# Patient Record
Sex: MALE | Race: BLACK OR AFRICAN AMERICAN | NOT HISPANIC OR LATINO | Employment: FULL TIME | URBAN - METROPOLITAN AREA
[De-identification: names, ages, dates, MRNs, and addresses within clinical notes are randomized per-mention and may not be internally consistent; named-entity substitution may affect disease eponyms.]

---

## 2017-10-09 ENCOUNTER — OFFICE VISIT (OUTPATIENT)
Dept: URGENT CARE | Facility: CLINIC | Age: 57
End: 2017-10-09
Payer: COMMERCIAL

## 2017-10-09 ENCOUNTER — APPOINTMENT (OUTPATIENT)
Dept: RADIOLOGY | Facility: CLINIC | Age: 57
End: 2017-10-09
Payer: COMMERCIAL

## 2017-10-09 DIAGNOSIS — R06.02 SHORTNESS OF BREATH: ICD-10-CM

## 2017-10-09 PROCEDURE — 71020 HB CHEST X-RAY 2VW FRONTAL&LATL: CPT

## 2017-10-09 PROCEDURE — 99214 OFFICE O/P EST MOD 30 MIN: CPT

## 2017-10-28 NOTE — PROGRESS NOTES
Assessment    1  SOB (shortness of breath) on exertion (786 05) (R06 02)   2  Upper respiratory infection, acute (465 9) (J06 9)    Plan   SOB (shortness of breath) on exertion    · Albuterol Sulfate (2 5 MG/3ML) 0 083% Inhalation Nebulization Solution   · Albuterol Sulfate (2 5 MG/3ML) 0 083% Inhalation Nebulization Solution   · Ipratropium Bromide 0 02 % Inhalation Solution   · Ipratropium Bromide 0 02 % Inhalation Solution  Upper respiratory infection, acute    · Azithromycin 250 MG Oral Tablet; TAKE 2 TABLETS ON DAY 1 THEN TAKE 1TABLET A DAY FOR 4 DAYS   · Benzonatate 200 MG Oral Capsule; TAKE 1 CAPSULE 3 times daily PRN cough   · PredniSONE 10 MG Oral Tablet; 6,5,4,3,2,2,1,1   · Ventolin  (90 Base) MCG/ACT Inhalation Aerosol Solution; INHALE 1 TO 2PUFFS EVERY 4 TO 6 HOURS AS NEEDED  * XR CHEST PA & LATERAL; Status:Resulted - Requires Verification;   Done: 28YDZ6060 12:00AM [id number];LVKQEXC; Stat;   For:SOB (shortness of breath) on exertion; Ordered By:Raissa Conteh;    Discussion/Summary  Discussion Summary:   Follow up with the pcpmeds as directed zyrtec daily meds as discussed to ER if symptoms worsensmoking  treatment given to pt by Socorro Severin  Pt tolerated well  Felt better on dischargeO2 sat was 96-97% no distress noted  treatment and prior to leaving pt stayed at O2 sats of 97% on RA  Medication Side Effects Reviewed: Possible side effects of new medications were reviewed with the patient/guardian today  Understands and agrees with treatment plan: The treatment plan was reviewed with the patient/guardian  The patient/guardian understands and agrees with the treatment plan   Counseling Documentation With Imm: The patient was counseled regarding instructions for management,-- patient and family education,-- importance of compliance with treatment  Follow Up Instructions: Follow Up with your Primary Care Provider in 1-2 days     If your symptoms worsen, go to the nearest [facility] Emergency Department  Chief Complaint    1  Cough  Chief Complaint Free Text Note Form: pt reports body aches mainly in shoulder blades and rib cage for 1+ week; productive cough with light yellow sputum; pt reports taking nyquil and now having issues controlling urine; 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 and requesting meds without alcohol content; pain 6/10      History of Present Illness  HPI: 62 yo male who is here today with coughing, body aches for over one week  He believes that at work he was exposed to mold at work  He has been sick since and having coughing spells  He has taken some OTC meds for chest congestion and nyquil (non alcoholic) and has some relief to help him sleep at night but he would notice that he had increased since taking nyquil  He stopped taking nyquil a few days ago,   Hospital Based Practices Required Assessment:  Pain Assessment  the patient states they have pain  Abuse And Domestic Violence Screen   Yes, the patient is safe at home  -- The patient states no one is hurting them  Depression And Suicide Screen  No, the patient has not had thoughts of hurting themself  No, the patient has not felt depressed in the past 7 days  Prefered Language is  english  Primary Language is  english  Review of Systems  Focused-Male:  Cardiovascular: no complaints of slow or fast heart rate, no chest pain, no palpitations, no leg claudication or lower extremity edema  Respiratory: as noted in HPI  ROS Reviewed:   ROS reviewed  Active Problems  1  Abdominal mass (789 30) (R19 00)   2  Abnormal glucose (790 29) (R73 09)   3  Acute sinusitis (461 9) (J01 90)   4  Current every day smoker (305 1) (F17 200)   5  Erectile dysfunction (607 84) (N52 9)   6  Hyperlipidemia (272 4) (E78 5)    Past Medical History  1  History of Denial Of Any Significant Medical History   2  History of bronchitis (V12 69) (Z87 09)   3  History of bronchitis (V12 69) (Z87 09)   4   History of pneumonia (V12 61) (Z87 01)  Active Problems And Past Medical History Reviewed: The active problems and past medical history were reviewed and updated today  Family History  Father    1  Family history of diabetes mellitus (V18 0) (Z83 3)  Paternal Grandfather    2  Family history of diabetes mellitus (V18 0) (Z83 3)  Uncle    3  Family history of Dialysis   4  Family history of diabetes mellitus (V18 0) (Z83 3)  Family History Reviewed: The family history was reviewed and updated today  Social History   · Current every day smoker (305 1) (F17 200)   · Current Every Day Smoker (305 1)   · History of Drug usage (305 90) (F19 90)  Social History Reviewed: The social history was reviewed and updated today  The social history was reviewed and is unchanged  Surgical History    1  History of Biopsy Soft Tissue Of The Back   2  History of Hand Surgery   3  History of Hernia Repair   4  History of Oral Surgery Tooth Extraction  Surgical History Reviewed: The surgical history was reviewed and updated today  Current Meds   1  Albuterol Sulfate (2 5 MG/3ML) 0 083% Inhalation Nebulization Solution; USE 1 UNIT DOSE IN NEBULIZER EVERY 6 HOURS AS NEEDED; Therapy: [id number] to [phone])  Requested for: 43CZL5500; Last Rx:[id number] Ordered   2  Flax Seed Oil 1000 MG Oral Capsule; Take as directed Recorded  Medication List Reviewed: The medication list was reviewed and updated today  Allergies    1  Penicillins    Vitals  Signs   Recorded: 30DND7368 11:51AM   Temperature: 98 4 F  Heart Rate: 105  Respiration: 18  Systolic: 408  Diastolic: 71  Height: 6 ft   Weight: 260 lb 8 oz  BMI Calculated: 35 33  BSA Calculated: 2 38  O2 Saturation: 92  Pain Scale: 6    Physical Exam   Constitutional  General appearance: No acute distress, well appearing and well nourished     Ears, Nose, Mouth, and Throat  External inspection of ears and nose: Normal    Otoscopic examination: Tympanic membrance translucent with normal light reflex  Canals patent without erythema  Nasal mucosa, septum, and turbinates: Normal without edema or erythema  Oropharynx: Normal with no erythema, edema, exudate or lesions  Pulmonary  Respiratory effort: Abnormal  -- increased coughing, congestion, sputurm production seen, yellow green mucus  Auscultation of lungs: Abnormal  -- decreased breath sounds b/l, increased congestion, increased rales noted in both lungs  Cardiovascular  Palpation of heart: Normal PMI, no thrills  Auscultation of heart: Abnormal  -- tachy  Abdomen  Abdomen: Non-tender, no masses  Lymphatic  Palpation of lymph nodes in neck: No lymphadenopathy  Musculoskeletal  Gait and station: Normal    Skin  Skin and subcutaneous tissue: Normal without rashes or lesions  Psychiatric  Orientation to person, place and time: Normal    Mood and affect: Normal    Additional Exam:  increased cough and mucus production noted, pt has been feeling like this for the past few days  Results/Data  Diagnostic Studies Reviewed: I personally reviewed the films/images/results in the office today  My interpretation follows  X-ray Review normal       Procedure   Treatment #1 included supplemental oxygen 2 L/min via nasal cannula  After the first nebulizer treatment, examination at revealed an oxygen saturation of 96%  After treatment #1, the patient noted symptomatic improvement  Air exchange was improved  No wheezes were appreciated  Rhonchi were heard diffusely  Rales were heard at the left lower lung field and at the right lower lung field  Signatures   Electronically signed by :  HUA Brewer; Oct  9 2017  4:02PM EST                       (Author)    Electronically signed by : Willy Panchal DO; Oct 10 2017  9:40AM EST                       (Co-author)

## 2018-05-23 RX ORDER — ALBUTEROL SULFATE 2.5 MG/3ML
1 SOLUTION RESPIRATORY (INHALATION) EVERY 6 HOURS PRN
COMMUNITY
Start: 2015-03-24 | End: 2018-05-24

## 2018-05-23 RX ORDER — ALBUTEROL SULFATE 90 UG/1
1-2 AEROSOL, METERED RESPIRATORY (INHALATION)
COMMUNITY
Start: 2017-10-09

## 2018-05-23 RX ORDER — DIMENHYDRINATE 50 MG
TABLET ORAL
COMMUNITY
End: 2018-05-24

## 2018-05-24 ENCOUNTER — OFFICE VISIT (OUTPATIENT)
Dept: FAMILY MEDICINE CLINIC | Facility: CLINIC | Age: 58
End: 2018-05-24
Payer: COMMERCIAL

## 2018-05-24 ENCOUNTER — APPOINTMENT (OUTPATIENT)
Dept: LAB | Facility: CLINIC | Age: 58
End: 2018-05-24
Payer: COMMERCIAL

## 2018-05-24 VITALS
DIASTOLIC BLOOD PRESSURE: 72 MMHG | HEIGHT: 72 IN | SYSTOLIC BLOOD PRESSURE: 120 MMHG | BODY MASS INDEX: 34.72 KG/M2 | RESPIRATION RATE: 16 BRPM | HEART RATE: 74 BPM | WEIGHT: 256.3 LBS | TEMPERATURE: 97.8 F

## 2018-05-24 DIAGNOSIS — N40.0 BENIGN PROSTATIC HYPERPLASIA WITHOUT LOWER URINARY TRACT SYMPTOMS: ICD-10-CM

## 2018-05-24 DIAGNOSIS — R73.09 ABNORMAL GLUCOSE: ICD-10-CM

## 2018-05-24 DIAGNOSIS — E78.5 HYPERLIPIDEMIA, UNSPECIFIED HYPERLIPIDEMIA TYPE: ICD-10-CM

## 2018-05-24 DIAGNOSIS — Z13.220 SCREENING FOR LIPID DISORDERS: ICD-10-CM

## 2018-05-24 DIAGNOSIS — Z11.59 ENCOUNTER FOR HEPATITIS C SCREENING TEST FOR LOW RISK PATIENT: ICD-10-CM

## 2018-05-24 DIAGNOSIS — Z12.5 SCREENING FOR PROSTATE CANCER: ICD-10-CM

## 2018-05-24 DIAGNOSIS — R09.89 GLOBUS SENSATION: ICD-10-CM

## 2018-05-24 DIAGNOSIS — Z12.11 SCREENING FOR COLON CANCER: ICD-10-CM

## 2018-05-24 DIAGNOSIS — Z00.00 WELL ADULT EXAM: Primary | ICD-10-CM

## 2018-05-24 LAB
ALBUMIN SERPL BCP-MCNC: 4.2 G/DL (ref 3.5–5)
ALP SERPL-CCNC: 68 U/L (ref 46–116)
ALT SERPL W P-5'-P-CCNC: 33 U/L (ref 12–78)
ANION GAP SERPL CALCULATED.3IONS-SCNC: 6 MMOL/L (ref 4–13)
AST SERPL W P-5'-P-CCNC: 20 U/L (ref 5–45)
BASOPHILS # BLD AUTO: 0.03 THOUSANDS/ΜL (ref 0–0.1)
BASOPHILS NFR BLD AUTO: 0 % (ref 0–1)
BILIRUB SERPL-MCNC: 0.46 MG/DL (ref 0.2–1)
BUN SERPL-MCNC: 16 MG/DL (ref 5–25)
CALCIUM SERPL-MCNC: 9.9 MG/DL (ref 8.3–10.1)
CHLORIDE SERPL-SCNC: 107 MMOL/L (ref 100–108)
CHOLEST SERPL-MCNC: 192 MG/DL (ref 50–200)
CO2 SERPL-SCNC: 27 MMOL/L (ref 21–32)
CREAT SERPL-MCNC: 1.1 MG/DL (ref 0.6–1.3)
CREAT UR-MCNC: 189 MG/DL
EOSINOPHIL # BLD AUTO: 0.32 THOUSAND/ΜL (ref 0–0.61)
EOSINOPHIL NFR BLD AUTO: 4 % (ref 0–6)
ERYTHROCYTE [DISTWIDTH] IN BLOOD BY AUTOMATED COUNT: 13.7 % (ref 11.6–15.1)
EST. AVERAGE GLUCOSE BLD GHB EST-MCNC: 177 MG/DL
GFR SERPL CREATININE-BSD FRML MDRD: 84 ML/MIN/1.73SQ M
GLUCOSE P FAST SERPL-MCNC: 123 MG/DL (ref 65–99)
HBA1C MFR BLD: 7.8 % (ref 4.2–6.3)
HCT VFR BLD AUTO: 42.7 % (ref 36.5–49.3)
HDLC SERPL-MCNC: 37 MG/DL (ref 40–60)
HGB BLD-MCNC: 14.1 G/DL (ref 12–17)
LDLC SERPL CALC-MCNC: 102 MG/DL (ref 0–100)
LYMPHOCYTES # BLD AUTO: 2.91 THOUSANDS/ΜL (ref 0.6–4.47)
LYMPHOCYTES NFR BLD AUTO: 34 % (ref 14–44)
MCH RBC QN AUTO: 30.4 PG (ref 26.8–34.3)
MCHC RBC AUTO-ENTMCNC: 33 G/DL (ref 31.4–37.4)
MCV RBC AUTO: 92 FL (ref 82–98)
MICROALBUMIN UR-MCNC: 18.8 MG/L (ref 0–20)
MICROALBUMIN/CREAT 24H UR: 10 MG/G CREATININE (ref 0–30)
MONOCYTES # BLD AUTO: 0.45 THOUSAND/ΜL (ref 0.17–1.22)
MONOCYTES NFR BLD AUTO: 5 % (ref 4–12)
NEUTROPHILS # BLD AUTO: 4.86 THOUSANDS/ΜL (ref 1.85–7.62)
NEUTS SEG NFR BLD AUTO: 57 % (ref 43–75)
NONHDLC SERPL-MCNC: 155 MG/DL
NRBC BLD AUTO-RTO: 0 /100 WBCS
PLATELET # BLD AUTO: 390 THOUSANDS/UL (ref 149–390)
PMV BLD AUTO: 10.5 FL (ref 8.9–12.7)
POTASSIUM SERPL-SCNC: 4 MMOL/L (ref 3.5–5.3)
PROT SERPL-MCNC: 8.2 G/DL (ref 6.4–8.2)
PSA SERPL-MCNC: 2.4 NG/ML (ref 0–4)
RBC # BLD AUTO: 4.64 MILLION/UL (ref 3.88–5.62)
SODIUM SERPL-SCNC: 140 MMOL/L (ref 136–145)
TRIGL SERPL-MCNC: 264 MG/DL
WBC # BLD AUTO: 8.6 THOUSAND/UL (ref 4.31–10.16)

## 2018-05-24 PROCEDURE — 83036 HEMOGLOBIN GLYCOSYLATED A1C: CPT

## 2018-05-24 PROCEDURE — 82043 UR ALBUMIN QUANTITATIVE: CPT | Performed by: FAMILY MEDICINE

## 2018-05-24 PROCEDURE — 36415 COLL VENOUS BLD VENIPUNCTURE: CPT

## 2018-05-24 PROCEDURE — 99396 PREV VISIT EST AGE 40-64: CPT | Performed by: FAMILY MEDICINE

## 2018-05-24 PROCEDURE — 80053 COMPREHEN METABOLIC PANEL: CPT

## 2018-05-24 PROCEDURE — 80061 LIPID PANEL: CPT

## 2018-05-24 PROCEDURE — 86803 HEPATITIS C AB TEST: CPT

## 2018-05-24 PROCEDURE — 85025 COMPLETE CBC W/AUTO DIFF WBC: CPT

## 2018-05-24 PROCEDURE — 82570 ASSAY OF URINE CREATININE: CPT | Performed by: FAMILY MEDICINE

## 2018-05-24 PROCEDURE — 3061F NEG MICROALBUMINURIA REV: CPT | Performed by: FAMILY MEDICINE

## 2018-05-24 PROCEDURE — G0103 PSA SCREENING: HCPCS

## 2018-05-24 NOTE — PATIENT INSTRUCTIONS
Hyperlipidemia  Due for FLP, will check today, pt reports he is fasting    Benign prostatic hyperplasia  For now defer medication, will monitor and check PSA as well    Abnormal glucose  In the past the patient has had an elevated A1c, this was not confirmed with any follow-up testing that I can tell, we will check him for diabetes now with a CBC, fasting blood sugar, A1c, and I will add on a urine microalbumin to check his kidneys  If he does in fact have an A1c that is elevated we will most likely want to start some of the medicines we discussed today including a baby aspirin, lisinopril for renal protection, and metformin to lower his blood sugars  If this is the case we will bring him back to the office to discuss diabetic measures  I would recommend he also have annual foot exam, dental exam, and I am looking for diabetic retinopathy if this is the case  Globus sensation  The patient I suspect has postnasal drip causing some hypertrophy and irritation at the back of his throat, prior to sending to ear nose and throat I would recommended 2 week trial of an over-the-counter antihistamine tablet like Claritin, Zyrtec, or Allegra, in addition to a nasal spray such as Flonase  He may use all of these as directed on their over-the-counter box instructions      HM  The patient reports he declined vaccinations at this time, he would be willing to revisit the shingles vaccine and the pneumonia vaccine in the future, we will await some of the results of his testing  I recommend that the patient have a colonoscopy, this is recommended for all patients over the age of 48, there are other screening tests available however if they are positive that he must have a colonoscopy anyway  Referral given for colonoscopy  Encouraged the patient to continue to follow a healthy diet and exercise regularly, the recommendation is at least 150 min weekly of moderate intensity activity    Patient agreeable today for lab screening test for hepatitis C, PSA, lipids

## 2018-05-24 NOTE — ASSESSMENT & PLAN NOTE
The patient I suspect has postnasal drip causing some hypertrophy and irritation at the back of his throat, prior to sending to ear nose and throat I would recommended 2 week trial of an over-the-counter antihistamine tablet like Claritin, Zyrtec, or Allegra, in addition to a nasal spray such as Flonase    He may use all of these as directed on their over-the-counter box instructions

## 2018-05-24 NOTE — ASSESSMENT & PLAN NOTE
In the past the patient has had an elevated A1c, this was not confirmed with any follow-up testing that I can tell, we will check him for diabetes now with a CBC, fasting blood sugar, A1c, and I will add on a urine microalbumin to check his kidneys  If he does in fact have an A1c that is elevated we will most likely want to start some of the medicines we discussed today including a baby aspirin, lisinopril for renal protection, and metformin to lower his blood sugars  If this is the case we will bring him back to the office to discuss diabetic measures  I would recommend he also have annual foot exam, dental exam, and I am looking for diabetic retinopathy if this is the case

## 2018-05-24 NOTE — PROGRESS NOTES
Family Medicine Annual Office Visit  Kacie Bell 60 y o  male   MRN: [id number] : 1960  ENCOUNTER: 2018 9:41 AM    Assessment and Plan   Hyperlipidemia  Due for FLP, will check today, pt reports he is fasting    Benign prostatic hyperplasia  For now defer medication, will monitor and check PSA as well    Abnormal glucose  In the past the patient has had an elevated A1c, this was not confirmed with any follow-up testing that I can tell, we will check him for diabetes now with a CBC, fasting blood sugar, A1c, and I will add on a urine microalbumin to check his kidneys  If he does in fact have an A1c that is elevated we will most likely want to start some of the medicines we discussed today including a baby aspirin, lisinopril for renal protection, and metformin to lower his blood sugars  If this is the case we will bring him back to the office to discuss diabetic measures  I would recommend he also have annual foot exam, dental exam, and I am looking for diabetic retinopathy if this is the case  Globus sensation  The patient I suspect has postnasal drip causing some hypertrophy and irritation at the back of his throat, prior to sending to ear nose and throat I would recommended 2 week trial of an over-the-counter antihistamine tablet like Claritin, Zyrtec, or Allegra, in addition to a nasal spray such as Flonase  He may use all of these as directed on their over-the-counter box instructions    HM  The patient reports he declined vaccinations at this time, he would be willing to revisit the shingles vaccine and the pneumonia vaccine in the future, we will await some of the results of his testing  I recommend that the patient have a colonoscopy, this is recommended for all patients over the age of 48, there are other screening tests available however if they are positive that he must have a colonoscopy anyway   Referral given for colonoscopy  Encouraged the patient to continue to follow a healthy diet and exercise regularly, the recommendation is at least 150 min weekly of moderate intensity activity  Patient agreeable today for lab screening test for hepatitis C, PSA, lipids  Chief Complaint     Chief Complaint   Patient presents with    Annual Exam    Sore Throat     started a few weeks ago, feels like something is stuck        History of Present Illness   Aiden Busby is a 60y o -year-old male who presents today for Well visit  Smokes <1ppd x 45 years  Does not drink ETOH  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---sober for 20 years, used to do "everything" but did not use needles  Patient reports that he is in a long term relationship, he is happy in his relationship, children are 20 and 21, one child still lives at home  Works for [employer] as a   Does exercise--walks for fitness  Eating a healthy diet, he tries to limit sugar containing foods or added sugars    Has had a globus sensation in his throat for about 3 weeks, trying to drink down to make it resolve, he is clearing his throat regularly  Still able to eat and drink but not resolving  + allergy symptoms (PND) and has a long history of sinus infections  Not currently taking any medications  Does not know of any medical problems he has  Noted there is an elevated A1c in his chart, he was unaware  Not taking any medications    Does not believe in vaccines because they make him sick  Review of Systems   Review of Systems   Constitutional: Negative for chills, fatigue, fever and unexpected weight change  HENT: Positive for postnasal drip, rhinorrhea and trouble swallowing (Globus sensation in throat x3 weeks)  Negative for hearing loss and sore throat  Eyes: Negative for discharge and visual disturbance  Respiratory: Negative for shortness of breath  Cardiovascular: Negative for chest pain  Gastrointestinal: Negative for constipation, diarrhea, nausea and vomiting  Genitourinary: Negative for dysuria          No incontinence   Musculoskeletal: Positive for arthralgias (aches and pains, over did yesterday with mowing lanw) and neck stiffness (ongoing for some time, patient attributes to how he sleeps)  Negative for myalgias  Skin: Negative for rash  Neurological: Negative for headaches  Hematological: Negative for adenopathy  Does not bruise/bleed easily  Psychiatric/Behavioral:        No anxiety or depression   All other systems reviewed and are negative  Active Problem List     Patient Active Problem List   Diagnosis    Abnormal glucose    Erectile dysfunction    Hyperlipidemia    Benign prostatic hyperplasia    Globus sensation       Past Medical History, Past Surgical History, Family History, and Social History were reviewed and updated today as appropriate  Objective   /72   Pulse 74   Temp 97 8 °F (36 6 °C)   Resp 16   Ht 6' (1 829 m)   Wt 116 kg (256 lb 4 8 oz)   BMI 34 76 kg/m²     Physical Exam   Constitutional: He is oriented to person, place, and time  Vital signs are normal  He appears well-developed and well-nourished  Non-toxic appearance  No distress  Appears Stated Age, obese, tall   HENT:   Head: Normocephalic and atraumatic  Right Ear: External ear normal    Left Ear: External ear normal    Nose: Nose normal    Mouth/Throat: Oropharynx is clear and moist  No oropharyngeal exudate  No significant oral abnormalities other than poor dentition   Eyes: Conjunctivae and EOM are normal  Pupils are equal, round, and reactive to light  Right eye exhibits no discharge  Left eye exhibits no discharge  Left conjunctiva is not injected  No scleral icterus  Right eye exhibits no nystagmus  Left eye exhibits no nystagmus  Accomodation intact   Neck: Normal range of motion  Neck supple  Carotid bruit is not present  No thyromegaly present  Cardiovascular: Normal rate, regular rhythm, S1 normal and S2 normal   Exam reveals no gallop and no friction rub      No murmur heard   Pulmonary/Chest: Effort normal and breath sounds normal  No respiratory distress  He has no wheezes  He has no rhonchi  He has no rales  No bruit   Abdominal: Soft  Bowel sounds are normal  He exhibits no distension  There is no tenderness  There is no rebound and no guarding  Genitourinary: Rectum normal  No penile tenderness  Genitourinary Comments: Mild uniform protstatic hypertrophy without nodularity   Musculoskeletal: He exhibits no edema  No clubbing or cyanosis   Lymphadenopathy:     He has no cervical adenopathy  Neurological: He is alert and oriented to person, place, and time  No cranial nerve deficit  He exhibits normal muscle tone  No focal neurologic deficits noted on exam, no change from baseline status   Skin: Skin is warm and dry  No rash noted  He is not diaphoretic  Psychiatric: He has a normal mood and affect  His behavior is normal  Thought content normal    stable   Vitals reviewed      Diabetic Foot Exam    Pertinent Laboratory/Diagnostic Studies:  Lab Results   Component Value Date    GLUCOSE 127 04/21/2015    BUN 14 04/21/2015    CREATININE 1 00 04/21/2015    CALCIUM 9 2 04/21/2015     04/21/2015    K 3 9 04/21/2015    CO2 24 04/21/2015     04/21/2015     Lab Results   Component Value Date    ALT 28 04/21/2015    AST 17 04/21/2015    ALKPHOS 68 04/21/2015    BILITOT 0 34 04/21/2015       Lab Results   Component Value Date    WBC 6 10 04/21/2015    HGB 13 7 04/21/2015    HCT 41 3 04/21/2015    MCV 92 04/21/2015     04/21/2015       No results found for: TSH    Lab Results   Component Value Date    CHOL 217 04/21/2015     Lab Results   Component Value Date    TRIG 390 04/21/2015     Lab Results   Component Value Date    HDL 42 04/21/2015     Lab Results   Component Value Date    LDLCALC 97 04/21/2015     Lab Results   Component Value Date    HGBA1C 7 2 (H) 04/21/2015       Results for orders placed or performed in visit on 04/21/15   TESTOSTERONE, FREE, TOTAL (HISTORICAL)   Result Value Ref Range    TESTOSTERONE TOTAL 135 (L) 348 - 1,197 ng/dL    TESTOSTERONE FREE 5 6 (L) 7 2 - 24 0 pg/mL    TESTOSTERONE COMMENT Comment    HEMOGLOBIN A1C (HISTORICAL)   Result Value Ref Range    Hemoglobin A1C 7 2 (H) 4 0 - 5 6 %     mg/dL   PSA,TOTAL SCREEN (HISTORICAL)   Result Value Ref Range    PSA 1 9 0 0 - 4 0 ng/mL   TSH, 3RD GENERATION WITH T4 REFLEX (HISTORICAL)   Result Value Ref Range    TSH 3RD GENERATON 1 253 0 550 - 4 780 uIU/ML   LIPID PANEL WITH DIRECT LDL REFLEX (HISTORICAL)   Result Value Ref Range    Triglycerides 390 mg/dL    Cholesterol 217 mg/dL    HDL 42 mg/dL    LDL Calculated 97 0 - 100 mg/dL   COMPREHENSIVE METABOLIC PANEL (HISTORICAL)   Result Value Ref Range    Sodium 141 136 - 145 mmol/L    Potassium 3 9 3 5 - 5 3 mmol/L    Chloride 106 100 - 108 mmol/L    CO2 24 23 - 33 mmol/L    Anion Gap 11 4 - 13 mmol/L    Glucose 127 65 - 140 mg/dL    BUN 14 5 - 25 mg/dL    Calcium 9 2 8 3 - 10 1 mg/dL    Creatinine 1 00 0 60 - 1 30 mg/dL    Total Bilirubin 0 34 0 20 - 1 00 mg/dL    Total Protein 7 6 6 4 - 8 2 g/dL    Alkaline Phosphatase 68 46 - 116 U/L    ALT 28 16 - 63 U/L    AST 17 0 - 45 U/L    Albumin 3 7 3 5 - 5 0 g/dL    AAGFR >60 ml/min/1 73sq m    NAAGFR >60 ml/min/1 73sq m   CBC AND DIFFERENTIAL (HISTORICAL)   Result Value Ref Range    WBC 6 10 4 31 - 10 16 Thousand/uL    RBC 4 50 3 88 - 5 62 Million/uL    Hemoglobin 13 7 12 0 - 17 0 g/dL    Hematocrit 41 3 36 5 - 49 3 %    MCV 92 82 - 98 fL    MCH 30 4 26 8 - 34 3 pg    MCHC 33 2 31 4 - 37 4 g/dL    Platelets 546 782 - 491 Thousand/uL    Neutrophils Relative 55 43 - 75 %    Neutrophils Absolute 3 36 1 85 - 7 62 Thousand/uL    RDW 13 7 11 6 - 15 1 %    MPV 10 6 8 9 - 12 7 fL    nRBC 0 /100 WBC    Lymphocytes Relative 33 14 - 44 %    Monocytes Relative 7 4 - 12 %    Eosinophils Relative 5 0 - 6 %    Lymphocytes Absolute 2 01 0 60 - 4 47 Thousand/uL    Monocytes Absolute 0 43 0 17 - 1 22 Thousand/uL Eosinophils Absolute 0 31 0 00 - 0 61 Thousand/uL    Basophils Absolute 0 02 0 00 - 0 10 Thousand/uL       Orders Placed This Encounter   Procedures    CBC and differential    Comprehensive metabolic panel    HEMOGLOBIN A1C W/ EAG ESTIMATION    Lipid panel    PSA, Total Screen    Hepatitis C antibody    Microalbumin / creatinine urine ratio    Ambulatory referral to Gastroenterology         Current Medications     Current Outpatient Prescriptions   Medication Sig Dispense Refill    albuterol (VENTOLIN HFA) 90 mcg/act inhaler Inhale 1-2 puffs       No current facility-administered medications for this visit  ALLERGIES:  Allergies   Allergen Reactions    Penicillins        Health Maintenance     Health Maintenance   Topic Date Due    HIV SCREENING  1960    Hepatitis C Screening  1960    COLONOSCOPY  1960    PNEUMOCOCCAL POLYSACCHARIDE VACCINE AGE 2-64 HIGH RISK  01/20/1961    Depression Screening PHQ-9  01/20/1971    DTaP,Tdap,and Td Vaccines (1 - Tdap) 01/20/1980    INFLUENZA VACCINE  09/01/2018       There is no immunization history on file for this patient  Andrés Rodriguez MD   [id number]  5/24/2018  9:41 AM    Parts of this note were dictated using M*Modal dictation software and may have sounds-like errors due to variation in pronunciation

## 2018-05-25 LAB — HCV AB SER QL: NORMAL

## 2018-05-30 ENCOUNTER — OFFICE VISIT (OUTPATIENT)
Dept: FAMILY MEDICINE CLINIC | Facility: CLINIC | Age: 58
End: 2018-05-30
Payer: COMMERCIAL

## 2018-05-30 VITALS
TEMPERATURE: 97.7 F | SYSTOLIC BLOOD PRESSURE: 122 MMHG | HEIGHT: 72 IN | BODY MASS INDEX: 34.47 KG/M2 | HEART RATE: 80 BPM | RESPIRATION RATE: 20 BRPM | DIASTOLIC BLOOD PRESSURE: 74 MMHG | WEIGHT: 254.5 LBS

## 2018-05-30 DIAGNOSIS — E78.5 HYPERLIPIDEMIA, UNSPECIFIED HYPERLIPIDEMIA TYPE: ICD-10-CM

## 2018-05-30 DIAGNOSIS — E11.9 TYPE 2 DIABETES MELLITUS WITHOUT COMPLICATION, WITHOUT LONG-TERM CURRENT USE OF INSULIN (HCC): Primary | ICD-10-CM

## 2018-05-30 PROCEDURE — 99214 OFFICE O/P EST MOD 30 MIN: CPT | Performed by: FAMILY MEDICINE

## 2018-05-30 PROCEDURE — 3008F BODY MASS INDEX DOCD: CPT | Performed by: FAMILY MEDICINE

## 2018-05-30 PROCEDURE — 4010F ACE/ARB THERAPY RXD/TAKEN: CPT | Performed by: FAMILY MEDICINE

## 2018-05-30 RX ORDER — LISINOPRIL 2.5 MG/1
2.5 TABLET ORAL DAILY
Qty: 30 TABLET | Refills: 2 | Status: SHIPPED | OUTPATIENT
Start: 2018-05-30

## 2018-05-30 RX ORDER — ATORVASTATIN CALCIUM 20 MG/1
20 TABLET, FILM COATED ORAL DAILY
Qty: 30 TABLET | Refills: 2 | Status: SHIPPED | OUTPATIENT
Start: 2018-05-30

## 2018-05-30 NOTE — ASSESSMENT & PLAN NOTE
The patient I extensively discussed that nature of diabetes today and what we can do to control it  I am very impressed with his dedication to changing his diet, and I look forward to seeing his great success with this     For now we will start with needing some diabetic core measures, I have recommended to the patient a baby aspirin daily, he believes he may have an allergy to aspirin in his family and he will check with family members before starting this medication    I recommended to the patient that he get vaccinated against pneumonia and shingles, he will take this under advisement but we will not  Vaccinated today, this will be addressed again at the next visit    The patient is agreeable to starting a statin medicine for cholesterol and vascular protection  Will start Lipitor 20 mg daily  We did discuss risks, benefits and alternatives  The patient is agreeable to starting metformin, we will do a titration program where he takes 1 tablet daily for 1 week with a meal, this should be taken with his largest meal of the day  The following week if he is tolerating this medicine he will take 1 tablet daily with breakfast and dinner  The next week he will take 1 tablet with breakfast and 2 tablets with dinner  The following week he will start routine maintenance dosing at 2 tablets with breakfast and 2 tablets with dinner  If he is tolerating this well we can change to the 1000 mg tablets so that he just asked to take 1 tablet with each meal     The patient's blood pressure control is excellent however given his diabetes I will start him on a daily 2 5 mg lisinopril for protection of his kidneys  He is agreeable to this   We did a diabetic foot exam today that was normal   The neck time the patient sees his eye doctor he should request a diabetic eye exam and have this sent to our office as well  Will see the patient back in 3-4 months I have given him a slip to have an A1c done before his next visit

## 2018-05-30 NOTE — PATIENT INSTRUCTIONS
Hyperlipidemia  Given lipids and DM2, will start Statin    Type 2 diabetes mellitus without complication, without long-term current use of insulin ([facility] [address] )   The patient I extensively discussed that nature of diabetes today and what we can do to control it  I am very impressed with his dedication to changing his diet, and I look forward to seeing his great success with this     For now we will start with needing some diabetic core measures, I have recommended to the patient a baby aspirin daily, he believes he may have an allergy to aspirin in his family and he will check with family members before starting this medication    I recommended to the patient that he get vaccinated against pneumonia and shingles, he will take this under advisement but we will not  Vaccinated today, this will be addressed again at the next visit    The patient is agreeable to starting a statin medicine for cholesterol and vascular protection  Will start Lipitor 20 mg daily  We did discuss risks, benefits and alternatives  The patient is agreeable to starting metformin, we will do a titration program where he takes 1 tablet daily for 1 week with a meal, this should be taken with his largest meal of the day  The following week if he is tolerating this medicine he will take 1 tablet daily with breakfast and dinner  The next week he will take 1 tablet with breakfast and 2 tablets with dinner  The following week he will start routine maintenance dosing at 2 tablets with breakfast and 2 tablets with dinner  If he is tolerating this well we can change to the 1000 mg tablets so that he just asked to take 1 tablet with each meal     The patient's blood pressure control is excellent however given his diabetes I will start him on a daily 2 5 mg lisinopril for protection of his kidneys    He is agreeable to this   We did a diabetic foot exam today that was normal   The neck time the patient sees his eye doctor he should request a diabetic eye exam and have this sent to our office as well  Will see the patient back in 3-4 months I have given him a slip to have an A1c done before his next visit

## 2018-05-30 NOTE — PROGRESS NOTES
Family Medicine Follow-Up Office Visit  Cachorro Fisher 60 y o  male   MRN: [id number] : 1960  ENCOUNTER: 2018 9:51 AM    Assessment and Plan   Hyperlipidemia  Given lipids and DM2, will start Statin    Type 2 diabetes mellitus without complication, without long-term current use of insulin ([facility] )   The patient I extensively discussed that nature of diabetes today and what we can do to control it  I am very impressed with his dedication to changing his diet, and I look forward to seeing his great success with this     For now we will start with needing some diabetic core measures, I have recommended to the patient a baby aspirin daily, he believes he may have an allergy to aspirin in his family and he will check with family members before starting this medication    I recommended to the patient that he get vaccinated against pneumonia and shingles, he will take this under advisement but we will not  Vaccinated today, this will be addressed again at the next visit    The patient is agreeable to starting a statin medicine for cholesterol and vascular protection  Will start Lipitor 20 mg daily  We did discuss risks, benefits and alternatives  The patient is agreeable to starting metformin, we will do a titration program where he takes 1 tablet daily for 1 week with a meal, this should be taken with his largest meal of the day  The following week if he is tolerating this medicine he will take 1 tablet daily with breakfast and dinner  The next week he will take 1 tablet with breakfast and 2 tablets with dinner  The following week he will start routine maintenance dosing at 2 tablets with breakfast and 2 tablets with dinner  If he is tolerating this well we can change to the 1000 mg tablets so that he just asked to take 1 tablet with each meal     The patient's blood pressure control is excellent however given his diabetes I will start him on a daily 2 5 mg lisinopril for protection of his kidneys    He is agreeable to this   We did a diabetic foot exam today that was normal   The neck time the patient sees his eye doctor he should request a diabetic eye exam and have this sent to our office as well  Will see the patient back in 3-4 months I have given him a slip to have an A1c done before his next visit  I have spent 30 minutes with Patient  today in which greater than 50% of this time was spent in counseling/coordination of care regarding Diagnostic results, Prognosis, Risks and benefits of tx options, Intructions for management, Patient and family education, Importance of tx compliance, Risk factor reductions and Impressions  Chief Complaint     Chief Complaint   Patient presents with    Follow-up     discuss labs/DM       History of Present Illness   Nilsa Fulton is a 60y o -year-old male who presents today for DM follow up  He has already changed his diet since we called him to tell him that he had DM2  He has started having less carbs, drinking water, eating healthy protein  He has many family members who have been affected by diabetes and he feels very committed to change his lifestyle  He feels as though he does not want to ever be on insulin and he reports to me that his understanding is that if he is able to lose weight and exercise and eat better he might not ever need that  I have firm for him that he is correct in this  Has constant refrain is that he would like to be around for some time yet and he will do any in all things recommended within reason to help monitor this  Today he has no other symptoms or complaints, he is here to gather information on his new diagnosis of diabetes, discussed the treatment plan, and discussed projected trajectory  Review of Systems   Review of Systems   Constitutional: Negative for chills, fatigue, fever and unexpected weight change  HENT: Positive for postnasal drip and rhinorrhea  Negative for hearing loss and sore throat      Eyes: Negative for discharge and visual disturbance  Respiratory: Positive for cough (picked up a cough/cold recently, feels this is starting to improve already)  Negative for shortness of breath  Cardiovascular: Negative for chest pain  Gastrointestinal: Negative for constipation, diarrhea, nausea and vomiting  Genitourinary: Negative for dysuria  No incontinence   Musculoskeletal: Positive for arthralgias (chronic stable aches and pains) and neck stiffness (stable, ongoing for some time, patient attributes to how he sleeps)  Negative for myalgias  Skin: Negative for rash  Neurological: Negative for headaches  Hematological: Negative for adenopathy  Does not bruise/bleed easily  Psychiatric/Behavioral:        No anxiety or depression   All other systems reviewed and are negative  Active Problem List     Patient Active Problem List   Diagnosis    Type 2 diabetes mellitus without complication, without long-term current use of insulin ([address] )    Erectile dysfunction    Hyperlipidemia    Benign prostatic hyperplasia    Globus sensation       Past Medical History, Past Surgical History, Family History, and Social History were reviewed and updated today as appropriate  Objective   /74   Pulse 80   Temp 97 7 °F (36 5 °C)   Resp 20   Ht 6' (1 829 m)   Wt 115 kg (254 lb 8 oz)   BMI 34 52 kg/m²     Physical Exam   Constitutional: He is oriented to person, place, and time  He appears well-developed and well-nourished  No distress  HENT:   Head: Normocephalic and atraumatic  Mouth/Throat: Oropharynx is clear and moist    mmm   Eyes: Conjunctivae and EOM are normal  Pupils are equal, round, and reactive to light  No scleral icterus  Cardiovascular: Pulses are no weak pulses  Pulses:       Dorsalis pedis pulses are 2+ on the right side, and 2+ on the left side  Posterior tibial pulses are 2+ on the right side, and 2+ on the left side     Pulmonary/Chest: Effort normal  No respiratory distress  Abdominal: Soft  He exhibits no distension  Musculoskeletal: He exhibits no edema  Feet:   Right Foot:   Skin Integrity: Positive for callus and dry skin  Negative for ulcer, skin breakdown, erythema or warmth  Left Foot:   Skin Integrity: Positive for callus and dry skin  Negative for ulcer, skin breakdown, erythema or warmth  Lymphadenopathy:     He has no cervical adenopathy  Neurological: He is alert and oriented to person, place, and time  No cranial nerve deficit  Skin: Skin is warm and dry  He is not diaphoretic  Psychiatric: He has a normal mood and affect  His behavior is normal    Vitals reviewed  Patient's shoes and socks removed  Right Foot/Ankle   Right Foot Inspection  Skin Exam: skin normal, skin intact, dry skin, callus and callus no warmth, no erythema, no maceration, no abnormal color, no pre-ulcer and no ulcer                          Toe Exam: ROM and strength within normal limitsno swelling, no tenderness, erythema and  no right toe deformity  Sensory       Monofilament testing: intact  Vascular  Capillary refills: < 3 seconds  The right DP pulse is 2+  The right PT pulse is 2+  Left Foot/Ankle  Left Foot Inspection  Skin Exam: skin normal, skin intact, dry skin and callusno warmth, no erythema, no maceration, normal color, no pre-ulcer and no ulcer                         Toe Exam: ROM and strength within normal limitsno swelling, no tenderness, no erythema and no left toe deformity                   Sensory       Monofilament: intact  Vascular  Capillary refills: < 3 seconds  The left DP pulse is 2+  The left PT pulse is 2+  Assign Risk Category:  No deformity present; No loss of protective sensation;  No weak pulses       Risk: 0      Pertinent Laboratory/Diagnostic Studies:  Lab Results   Component Value Date    GLUCOSE 127 04/21/2015    BUN 16 05/24/2018    CREATININE 1 10 05/24/2018    CALCIUM 9 9 05/24/2018     05/24/2018    K 4 0 05/24/2018    CO2 27 05/24/2018     05/24/2018     Lab Results   Component Value Date    ALT 33 05/24/2018    AST 20 05/24/2018    ALKPHOS 68 05/24/2018    BILITOT 0 46 05/24/2018       Lab Results   Component Value Date    WBC 8 60 05/24/2018    HGB 14 1 05/24/2018    HCT 42 7 05/24/2018    MCV 92 05/24/2018     05/24/2018       No results found for: TSH    Lab Results   Component Value Date    CHOL 192 05/24/2018     Lab Results   Component Value Date    TRIG 264 (H) 05/24/2018     Lab Results   Component Value Date    HDL 37 (L) 05/24/2018     Lab Results   Component Value Date    LDLCALC 102 (H) 05/24/2018     Lab Results   Component Value Date    HGBA1C 7 8 (H) 05/24/2018       Results for orders placed or performed in visit on 05/24/18   CBC and differential   Result Value Ref Range    WBC 8 60 4 31 - 10 16 Thousand/uL    RBC 4 64 3 88 - 5 62 Million/uL    Hemoglobin 14 1 12 0 - 17 0 g/dL    Hematocrit 42 7 36 5 - 49 3 %    MCV 92 82 - 98 fL    MCH 30 4 26 8 - 34 3 pg    MCHC 33 0 31 4 - 37 4 g/dL    RDW 13 7 11 6 - 15 1 %    MPV 10 5 8 9 - 12 7 fL    Platelets 763 635 - 242 Thousands/uL    nRBC 0 /100 WBCs    Neutrophils Relative 57 43 - 75 %    Lymphocytes Relative 34 14 - 44 %    Monocytes Relative 5 4 - 12 %    Eosinophils Relative 4 0 - 6 %    Basophils Relative 0 0 - 1 %    Neutrophils Absolute 4 86 1 85 - 7 62 Thousands/µL    Lymphocytes Absolute 2 91 0 60 - 4 47 Thousands/µL    Monocytes Absolute 0 45 0 17 - 1 22 Thousand/µL    Eosinophils Absolute 0 32 0 00 - 0 61 Thousand/µL    Basophils Absolute 0 03 0 00 - 0 10 Thousands/µL   Comprehensive metabolic panel   Result Value Ref Range    Sodium 140 136 - 145 mmol/L    Potassium 4 0 3 5 - 5 3 mmol/L    Chloride 107 100 - 108 mmol/L    CO2 27 21 - 32 mmol/L    Anion Gap 6 4 - 13 mmol/L    BUN 16 5 - 25 mg/dL    Creatinine 1 10 0 60 - 1 30 mg/dL    Glucose, Fasting 123 (H) 65 - 99 mg/dL    Calcium 9 9 8 3 - 10 1 mg/dL    AST 20 5 - 45 U/L    ALT 33 12 - 78 U/L Alkaline Phosphatase 68 46 - 116 U/L    Total Protein 8 2 6 4 - 8 2 g/dL    Albumin 4 2 3 5 - 5 0 g/dL    Total Bilirubin 0 46 0 20 - 1 00 mg/dL    eGFR 84 ml/min/1 73sq m   HEMOGLOBIN A1C W/ EAG ESTIMATION   Result Value Ref Range    Hemoglobin A1C 7 8 (H) 4 2 - 6 3 %     mg/dl   Lipid panel   Result Value Ref Range    Cholesterol 192 50 - 200 mg/dL    Triglycerides 264 (H) <=150 mg/dL    HDL, Direct 37 (L) 40 - 60 mg/dL    LDL Calculated 102 (H) 0 - 100 mg/dL    Non-HDL-Chol (CHOL-HDL) 155 mg/dl   PSA, Total Screen   Result Value Ref Range    PSA 2 4 0 0 - 4 0 ng/mL   Hepatitis C antibody   Result Value Ref Range    Hepatitis C Ab Non-reactive Non-reactive       Orders Placed This Encounter   Procedures    HEMOGLOBIN A1C W/ EAG ESTIMATION         Current Medications     Current Outpatient Prescriptions   Medication Sig Dispense Refill    albuterol (VENTOLIN HFA) 90 mcg/act inhaler Inhale 1-2 puffs      atorvastatin (LIPITOR) 20 mg tablet Take 1 tablet (20 mg total) by mouth daily 30 tablet 2    lisinopril (ZESTRIL) 2 5 mg tablet Take 1 tablet (2 5 mg total) by mouth daily 30 tablet 2    metFORMIN (GLUCOPHAGE) 500 mg tablet 1 tablet daily qam x1 week, 1 tablet BIDAC x1 week, 1 tablet qam and 2 tablets q dinner x1 week, 2 tablets bidac 120 tablet 2     No current facility-administered medications for this visit          ALLERGIES:  Allergies   Allergen Reactions    Penicillins        Health Maintenance     Health Maintenance   Topic Date Due    HIV SCREENING  1960    COLONOSCOPY  1960    PNEUMOCOCCAL POLYSACCHARIDE VACCINE AGE 2-64 HIGH RISK  01/20/1961    Diabetic Foot Exam  01/20/1969    OPHTHALMOLOGY EXAM  01/20/1969    DTaP,Tdap,and Td Vaccines (1 - Tdap) 01/20/1980    INFLUENZA VACCINE  09/01/2018    HEMOGLOBIN A1C  11/24/2018    Depression Screening PHQ-9  05/24/2019    URINE MICROALBUMIN  05/24/2019    Hepatitis C Screening  Completed       There is no immunization history on file for this patient  Art Cage MD   [id number] [facility]  5/30/2018  9:51 AM    Parts of this note were dictated using M*Modal dictation software and may have sounds-like errors due to variation in pronunciation

## 2019-03-01 ENCOUNTER — OFFICE VISIT (OUTPATIENT)
Dept: URGENT CARE | Facility: CLINIC | Age: 59
End: 2019-03-01
Payer: COMMERCIAL

## 2019-03-01 VITALS
HEART RATE: 92 BPM | TEMPERATURE: 97.7 F | BODY MASS INDEX: 33.72 KG/M2 | HEIGHT: 72 IN | SYSTOLIC BLOOD PRESSURE: 130 MMHG | DIASTOLIC BLOOD PRESSURE: 78 MMHG | WEIGHT: 249 LBS | RESPIRATION RATE: 16 BRPM | OXYGEN SATURATION: 97 %

## 2019-03-01 DIAGNOSIS — J01.90 ACUTE SINUSITIS, RECURRENCE NOT SPECIFIED, UNSPECIFIED LOCATION: Primary | ICD-10-CM

## 2019-03-01 PROCEDURE — 99214 OFFICE O/P EST MOD 30 MIN: CPT | Performed by: PHYSICIAN ASSISTANT

## 2019-03-01 RX ORDER — PREDNISONE 10 MG/1
TABLET ORAL
Qty: 21 TABLET | Refills: 0 | Status: SHIPPED | OUTPATIENT
Start: 2019-03-01

## 2019-03-01 NOTE — PROGRESS NOTES
NAME: Lucía Lowery is a 60 y o  male  : 1960    MRN: [id number]      Assessment and Plan   Acute sinusitis, recurrence not specified, unspecified location [J01 90]  1  Acute sinusitis, recurrence not specified, unspecified location  predniSONE 10 mg tablet       Patient reports that he has an antibiotic as he gets this yearly and can prescribe it to himself by now because I know what I need    Reports severe sinus pressure  Will send a steroid as patient's physical exam is benign, with cobblestoning and has only been 4 days  Patient Instructions   Patient Instructions   Take prednisone as directed  Increased fluids and rest  Use Flonase  Antihistamine like Allegra or Zyrtec  If no improvement in 3-4 days follow-up with PCP  If anything changes or worsens follow-up sooner    Proceed to ER if symptoms worsen  Chief Complaint     Chief Complaint   Patient presents with    Sinusitis     productive cough, head pressure for 4 days         History of Present Illness   Patient with no past medical history presents complaining of 4 day history of sinus pain and pressure  He reports he get this yearly and states he has sinus pain and pressure, congestion, runny nose, chest congestion, cough  He reports no sore throat or ear pain, denies fever or chills, shortness of breath or dyspnea  He has not taken anything over-the-counter for his symptoms  Review of Systems   Review of Systems   Constitutional: Negative for chills and fever  HENT: Positive for congestion, postnasal drip, rhinorrhea, sinus pressure and sinus pain  Negative for sore throat  Respiratory: Positive for cough and chest tightness  Negative for shortness of breath and wheezing  Cardiovascular: Negative for chest pain and palpitations           Current Medications       Current Outpatient Medications:     albuterol (VENTOLIN HFA) 90 mcg/act inhaler, Inhale 1-2 puffs, Disp: , Rfl:     atorvastatin (LIPITOR) 20 mg tablet, Take 1 tablet (20 mg total) by mouth daily (Patient not taking: Reported on 3/1/2019), Disp: 30 tablet, Rfl: 2    lisinopril (ZESTRIL) 2 5 mg tablet, Take 1 tablet (2 5 mg total) by mouth daily (Patient not taking: Reported on 3/1/2019), Disp: 30 tablet, Rfl: 2    metFORMIN (GLUCOPHAGE) 500 mg tablet, 1 tablet daily qam x1 week, 1 tablet BIDAC x1 week, 1 tablet qam and 2 tablets q dinner x1 week, 2 tablets bidac (Patient not taking: Reported on 3/1/2019), Disp: 120 tablet, Rfl: 2    predniSONE 10 mg tablet, Take 6 tablets today, 5 tablets tomorrow, 4 the next day, 3 the next day, 2 the following and 1 the last day- all with food, Disp: 21 tablet, Rfl: 0    Current Allergies     Allergies as of 03/01/2019 - Reviewed 03/01/2019   Allergen Reaction Noted    Penicillins  09/20/2013              Past Medical History:   Diagnosis Date    Pneumonia     Last Assssed:1/28/15       Past Surgical History:   Procedure Laterality Date    HAND SURGERY Left     knocked over glass which broke through left hand blood vessel    HERNIA REPAIR      Last Assessed:6/25/14    SOFT TISSUE BIOPSY      Of the back; Last Assessed:6/25/14    WISDOM TOOTH EXTRACTION         Family History   Problem Relation Age of Onset    Diabetes Father     Diabetes Paternal Grandfather     Diabetes Other     Other Other         Dialysis         Medications have been verified  The following portions of the patient's history were reviewed and updated as appropriate: allergies, current medications, past family history, past medical history, past social history, past surgical history and problem list     Objective   /78   Pulse 92   Temp 97 7 °F (36 5 °C)   Resp 16   Ht 6' (1 829 m)   Wt 113 kg (249 lb)   SpO2 97%   BMI 33 77 kg/m²      Physical Exam     Physical Exam   Constitutional: He appears well-developed and well-nourished  No distress  HENT:   TMs are clear bilaterally without erythema or bulging  Clear fluid behind both TMs  Nasal mucosa is mildly erythematous with and edematous  Clear rhinorrhea present  Posterior oropharynx with cobblestoning, without erythema, edema, tonsillar hypertrophy or exudates  +clear PND   Cardiovascular: Normal rate, regular rhythm and normal heart sounds  Pulmonary/Chest: Effort normal and breath sounds normal  No stridor  No respiratory distress  He has no wheezes  He has no rales  No rhonchi  No decreased breath sounds   Lymphadenopathy:     He has no cervical adenopathy  Skin: He is not diaphoretic

## 2020-01-14 ENCOUNTER — TELEPHONE (OUTPATIENT)
Dept: FAMILY MEDICINE CLINIC | Facility: CLINIC | Age: 60
End: 2020-01-14

## 2020-01-14 NOTE — TELEPHONE ENCOUNTER
----- Message from Adrian Thorne sent at 1/13/2020 11:46 AM EST -----  Regarding: RE: Schedule  Mailed letter   ----- Message -----  From: Adrian Thorne  Sent: 5/15/2019   9:22 AM EST  To: Adrian Thorne  Subject: RE: Schedule                                     Spoke w/ pt - still does not have insurance at the current time  ----- Message -----  From: Adrian Thorne  Sent: 4/17/2019  10:10 AM EDT  To: Adrian Thorne MA  Subject: RE: Schedule                                     Spoke w/ pt - currently resigned from his job so he does not have insurance at the current time  He said he will call back in a few weeks when his new insurance goes into effect  I told him that I would check in with him a couple weeks if we do not hear anything    ----- Message -----  From: Lorena Licona  Sent: 3/29/2019   1:13 PM  To: Adrian Thorne MA  Subject: Schedule                                         Please attempt to call and schedule pt for DM check  Last seen by Dr Gisselle Ferrell on 5/2018  If scheduled after 5/24/19   Schedule a Physical